# Patient Record
Sex: FEMALE | Race: WHITE | NOT HISPANIC OR LATINO | Employment: FULL TIME | URBAN - METROPOLITAN AREA
[De-identification: names, ages, dates, MRNs, and addresses within clinical notes are randomized per-mention and may not be internally consistent; named-entity substitution may affect disease eponyms.]

---

## 2018-01-17 ENCOUNTER — HOSPITAL ENCOUNTER (EMERGENCY)
Facility: HOSPITAL | Age: 43
Discharge: HOME | End: 2018-01-17
Payer: COMMERCIAL

## 2018-01-17 VITALS
RESPIRATION RATE: 20 BRPM | BODY MASS INDEX: 47.39 KG/M2 | TEMPERATURE: 99 F | WEIGHT: 251 LBS | OXYGEN SATURATION: 98 % | HEART RATE: 90 BPM | HEIGHT: 61 IN

## 2018-01-17 DIAGNOSIS — M25.511 SHOULDER PAIN, RIGHT: ICD-10-CM

## 2018-01-17 PROCEDURE — 99283 EMERGENCY DEPT VISIT LOW MDM: CPT

## 2018-01-17 RX ORDER — CYCLOBENZAPRINE HCL 10 MG
10 TABLET ORAL NIGHTLY PRN
Qty: 10 TABLET | Refills: 0 | Status: SHIPPED | OUTPATIENT
Start: 2018-01-17 | End: 2018-01-27

## 2018-01-17 RX ORDER — DICLOFENAC SODIUM 75 MG/1
75 TABLET, DELAYED RELEASE ORAL 2 TIMES DAILY
Qty: 10 TABLET | Refills: 0 | Status: SHIPPED | OUTPATIENT
Start: 2018-01-17

## 2018-01-17 RX ORDER — DULOXETIN HYDROCHLORIDE 60 MG/1
60 CAPSULE, DELAYED RELEASE ORAL DAILY
COMMUNITY

## 2018-01-18 NOTE — ED PROVIDER NOTES
"  History      Chief Complaint   Patient presents with    Arm Pain     Pt states, "I can't raise my right arm." Reports pain in R shoulder with raising arm. Onset last night. Denies injury or trauma.        Review of patient's allergies indicates:  No Known Allergies     HPI   HPI    2018, 10:14 PM   History obtained from the patient      History of Present Illness: Billie Samuel is a 42 y.o. female patient who presents to the Emergency Department for right shoulder pain x one day.  Denies trauma/injury.  Patient reports pain worse with movement.  No treatments tried.  Denies fever, chest pain, SOB, dizziness, numbness, tingling.       Arrival mode: Personal vehicle      PCP: No primary care provider on file.       Past Medical History:  Past Medical History:   Diagnosis Date    Anxiety     DM (diabetes mellitus), type 2        Past Surgical History:  Past Surgical History:   Procedure Laterality Date     SECTION      TEAR DUCT SURGERY           Family History:  History reviewed. No pertinent family history.    Social History:  Social History     Social History Main Topics    Smoking status: Current Every Day Smoker     Packs/day: 1.00     Types: Cigarettes    Smokeless tobacco: Never Used    Alcohol use No    Drug use: No    Sexual activity: Not on file       ROS   Review of Systems   Constitutional: Negative for chills and fever.   HENT: Negative for congestion and rhinorrhea.    Respiratory: Negative for cough and wheezing.    Gastrointestinal: Negative for diarrhea and vomiting.   Musculoskeletal: Positive for arthralgias and myalgias.       Physical Exam      Initial Vitals [18 1548]   BP Pulse Resp Temp SpO2   -- 90 20 98.6 °F (37 °C) 98 %      MAP       --          Physical Exam  Nursing Notes and Vital Signs Reviewed.  Constitutional: Patient is in no apparent distress. Awake and alert. Well-developed and well-nourished.  Head: Atraumatic. Normocephalic.  Eyes: PERRL. EOM " "intact. Conjunctivae are not pale. No scleral icterus.  ENT: Mucous membranes are moist. Oropharynx is clear and symmetric.    Neck: Supple. Full ROM. No lymphadenopathy.  Cardiovascular: Regular rate. Regular rhythm. No murmurs, rubs, or gallops. Distal pulses are 2+ and symmetric.  Pulmonary/Chest: No respiratory distress. Clear to auscultation bilaterally. No wheezing, rales, or rhonchi.  Abdominal: Soft and non-distended.  There is no tenderness.  No rebound, guarding, or rigidity. Good bowel sounds.  Musculoskeletal: Moves all extremities. No obvious deformities. No edema. No calf tenderness.  RUE:  Pain with extension and flexion of right arm. TTP anterior and posterior shoulder.  Decreased active ROM,  Full passive ROM.  Skin: Warm and dry.  Neurological:  Alert, awake, and appropriate.  Normal speech.  No acute focal neurological deficits are appreciated.  Psychiatric: Normal affect. Good eye contact. Appropriate in content.    ED Course    Procedures  ED Vital Signs:  Vitals:    01/17/18 1548   Pulse: 90   Resp: 20   Temp: 98.6 °F (37 °C)   TempSrc: Oral   SpO2: 98%   Weight: 113.9 kg (251 lb)   Height: 5' 1" (1.549 m)       Abnormal Lab Results:  Labs Reviewed - No data to display         Imaging Results:  Imaging Results          X-Ray Shoulder Complete 2 View Right (Final result)  Result time 01/17/18 16:39:55    Final result by Thong Dos Santos MD (01/17/18 16:39:55)                 Impression:      No acute findings.  Small periarticular calcifications.      Electronically signed by: THONG DOS SANTOS MD  Date:     01/17/18  Time:    16:39              Narrative:    Exam: XR SHOULDER COMPLETE 2 OR MORE VIEWS RIGHT,    Date:  01/17/18 16:31:02    History: Right shoulder joint pain    Comparison:  No prior relevant studies available    Findings: No acute fracture or dislocation.  The a.c. joint is normal.  There are small calcifications adjacent to the humeral head which may represent evidence of " calcific bursitis or tendinitis.                                      The Emergency Provider reviewed the vital signs and test results, which are outlined above.    ED Discussion     5:18 PM: Discussed with pt all pertinent ED information and results. Discussed pt dx and plan of tx. Gave pt all f/u and return to the ED instructions. All questions and concerns were addressed at this time. Pt expresses understanding of information and instructions, and is comfortable with plan to discharge. Pt is stable for discharge.    I discussed with patient and/or family/caretaker that negative X-ray does not rule out occult fracture or other soft tissue injury.  Persistent pain greater than 7-10 days or increased pain requires follow up, specifically with orthopedics.     I discussed with patient and/or family/caretaker that evaluation in the ED does not suggest any emergent or life threatening medical conditions requiring immediate intervention beyond what was provided in the ED, and I believe patient is safe for discharge.  Regardless, an unremarkable evaluation in the ED does not preclude the development or presence of a serious of life threatening condition. As such, patient was instructed to return immediately for any worsening or change in current symptoms.        ED Medication(s):  Medications - No data to display    Discharge Medication List as of 1/17/2018  5:18 PM      START taking these medications    Details   cyclobenzaprine (FLEXERIL) 10 MG tablet Take 1 tablet (10 mg total) by mouth nightly as needed., Starting Wed 1/17/2018, Until Sat 1/27/2018, Print      diclofenac (VOLTAREN) 75 MG EC tablet Take 1 tablet (75 mg total) by mouth 2 (two) times daily., Starting Wed 1/17/2018, Print             Follow-up Information     [facility] In 3 days.    Specialty:  Orthopedics  Contact information:  [address]  [facility] [address] [phone]  Additional information:  [facility] 2nd  floor                   Medical Decision Making                  Clinical Impression       ICD-10-CM ICD-9-CM   1. Shoulder pain, right M25.511 719.41       Disposition:   Disposition: Discharged  Condition: Stable           Alicia Welsh PA-C  01/17/18 2210

## 2021-04-29 ENCOUNTER — PATIENT MESSAGE (OUTPATIENT)
Dept: RESEARCH | Facility: HOSPITAL | Age: 46
End: 2021-04-29

## 2021-06-17 ENCOUNTER — OFFICE VISIT (OUTPATIENT)
Dept: DERMATOLOGY | Facility: CLINIC | Age: 46
End: 2021-06-17
Payer: COMMERCIAL

## 2021-06-17 DIAGNOSIS — L82.1 SEBORRHEIC KERATOSIS: ICD-10-CM

## 2021-06-17 DIAGNOSIS — L30.4 INTERTRIGO: ICD-10-CM

## 2021-06-17 DIAGNOSIS — L73.2 HIDRADENITIS SUPPURATIVA: Primary | ICD-10-CM

## 2021-06-17 DIAGNOSIS — Z51.81 MEDICATION MONITORING ENCOUNTER: ICD-10-CM

## 2021-06-17 PROCEDURE — 99204 OFFICE O/P NEW MOD 45 MIN: CPT | Mod: S$GLB,,, | Performed by: DERMATOLOGY

## 2021-06-17 PROCEDURE — 99999 PR PBB SHADOW E&M-EST. PATIENT-LVL III: ICD-10-PCS | Mod: PBBFAC,,, | Performed by: DERMATOLOGY

## 2021-06-17 PROCEDURE — 99999 PR PBB SHADOW E&M-EST. PATIENT-LVL III: CPT | Mod: PBBFAC,,, | Performed by: DERMATOLOGY

## 2021-06-17 PROCEDURE — 99204 PR OFFICE/OUTPT VISIT, NEW, LEVL IV, 45-59 MIN: ICD-10-PCS | Mod: S$GLB,,, | Performed by: DERMATOLOGY

## 2021-06-17 RX ORDER — GABAPENTIN 300 MG/1
300 CAPSULE ORAL
COMMUNITY
Start: 2021-06-15

## 2021-06-17 RX ORDER — METFORMIN HYDROCHLORIDE 500 MG/1
1500 TABLET ORAL
COMMUNITY
Start: 2018-05-01

## 2021-06-17 RX ORDER — SPIRONOLACTONE 50 MG/1
TABLET, FILM COATED ORAL
Qty: 60 TABLET | Refills: 3 | Status: SHIPPED | OUTPATIENT
Start: 2021-06-17

## 2021-06-17 RX ORDER — LIRAGLUTIDE 6 MG/ML
INJECTION SUBCUTANEOUS
COMMUNITY
Start: 2021-03-23

## 2021-06-17 RX ORDER — LISINOPRIL 10 MG/1
10 TABLET ORAL DAILY
COMMUNITY
Start: 2021-01-29

## 2021-06-17 RX ORDER — BUSPIRONE HYDROCHLORIDE 10 MG/1
20 TABLET ORAL
COMMUNITY
Start: 2021-06-15

## 2021-06-17 RX ORDER — DOXYCYCLINE 100 MG/1
100 CAPSULE ORAL 2 TIMES DAILY
Qty: 28 CAPSULE | Refills: 0 | Status: SHIPPED | OUTPATIENT
Start: 2021-06-17 | End: 2021-07-01

## 2021-06-17 RX ORDER — INSULIN GLARGINE 300 U/ML
10 INJECTION, SOLUTION SUBCUTANEOUS
COMMUNITY
Start: 2021-06-16

## 2021-06-17 RX ORDER — DULOXETIN HYDROCHLORIDE 60 MG/1
60 CAPSULE, DELAYED RELEASE ORAL
COMMUNITY
Start: 2021-06-15

## 2021-06-17 RX ORDER — CLINDAMYCIN PHOSPHATE 11.9 MG/ML
SOLUTION TOPICAL 2 TIMES DAILY
Qty: 60 ML | Refills: 3 | Status: SHIPPED | OUTPATIENT
Start: 2021-06-17

## 2021-06-17 RX ORDER — ATORVASTATIN CALCIUM 80 MG/1
80 TABLET, FILM COATED ORAL DAILY
COMMUNITY
Start: 2021-02-01

## 2021-06-17 RX ORDER — EMPAGLIFLOZIN 25 MG/1
25 TABLET, FILM COATED ORAL DAILY
COMMUNITY
Start: 2021-04-26

## 2021-06-17 RX ORDER — ERGOCALCIFEROL 1.25 MG/1
50000 CAPSULE ORAL
COMMUNITY
Start: 2021-02-01 | End: 2022-02-01

## 2022-02-28 ENCOUNTER — HOSPITAL ENCOUNTER (EMERGENCY)
Facility: HOSPITAL | Age: 47
Discharge: HOME | End: 2022-02-28
Attending: EMERGENCY MEDICINE
Payer: COMMERCIAL

## 2022-02-28 VITALS
HEART RATE: 97 BPM | RESPIRATION RATE: 18 BRPM | WEIGHT: 261.81 LBS | OXYGEN SATURATION: 100 % | HEIGHT: 62 IN | BODY MASS INDEX: 48.18 KG/M2 | TEMPERATURE: 99 F | DIASTOLIC BLOOD PRESSURE: 85 MMHG | SYSTOLIC BLOOD PRESSURE: 181 MMHG

## 2022-02-28 DIAGNOSIS — M54.50 LOW BACK PAIN, UNSPECIFIED BACK PAIN LATERALITY, UNSPECIFIED CHRONICITY, UNSPECIFIED WHETHER SCIATICA PRESENT: Primary | ICD-10-CM

## 2022-02-28 PROCEDURE — 99284 EMERGENCY DEPT VISIT MOD MDM: CPT | Mod: 25,ER

## 2022-02-28 RX ORDER — METHYLPREDNISOLONE 4 MG/1
TABLET ORAL
Qty: 21 EACH | Refills: 0 | Status: SHIPPED | OUTPATIENT
Start: 2022-02-28 | End: 2022-03-21

## 2022-02-28 NOTE — ED PROVIDER NOTES
Encounter Date: 2022       History     Chief Complaint   Patient presents with    Back Pain     With right leg pain; pt states she fell on her buttocks approx 2 weeks ago, was seen and treated at urgent care; did not have xrays; back and leg are not better.     Patient presents to ER for low back pain, onset approximately 2 week ago after experiencing trip and fall.  Patient states she was evaluated at a local urgent care and was discharged with prescription for ibuprofen and muscle relaxant, which initially provided relief but she states the pain has worsened since onset.  Pain is localized to the lumbar region.  Pain radiates down right leg.  She denies numbness, tingling, saddle anesthesia, bladder/bowel dysfunction, fever, dysuria, flank pain.    The history is provided by the patient.     Review of patient's allergies indicates:   Allergen Reactions    Metal staples [surgical stainless steel] Other (See Comments)     Pt states she cannot have metal penetrate body, such as staples, ect. Gets keloids from this.    Icosapent ethyl Rash     Past Medical History:   Diagnosis Date    Anxiety     DM (diabetes mellitus), type 2      Past Surgical History:   Procedure Laterality Date     SECTION      TEAR DUCT SURGERY       No family history on file.  Social History     Tobacco Use    Smoking status: Current Every Day Smoker     Packs/day: 1.00     Types: Cigarettes    Smokeless tobacco: Never Used   Substance Use Topics    Alcohol use: No    Drug use: No     Review of Systems   Constitutional: Negative for chills, fatigue and fever.   HENT: Negative for ear pain, rhinorrhea, sinus pressure, sinus pain and sore throat.    Eyes: Negative for pain.   Respiratory: Negative for cough and shortness of breath.    Cardiovascular: Negative for chest pain and palpitations.   Gastrointestinal: Negative for abdominal pain, nausea and vomiting.   Genitourinary: Negative for difficulty urinating, dysuria,  flank pain and frequency.   Musculoskeletal: Positive for back pain. Negative for myalgias and neck pain.   Skin: Negative for rash.   Neurological: Negative for weakness, numbness and headaches.   All other systems reviewed and are negative.      Physical Exam     Initial Vitals   BP Pulse Resp Temp SpO2   02/28/22 1205 02/28/22 1202 02/28/22 1202 02/28/22 1202 02/28/22 1202   (!) 181/85 97 18 98.9 °F (37.2 °C) 100 %      MAP       --                Physical Exam    Nursing note and vitals reviewed.  Constitutional: She appears well-developed and well-nourished. She is not diaphoretic. No distress.   HENT:   Head: Normocephalic and atraumatic.   Nose: Nose normal.   Eyes: Conjunctivae and EOM are normal. Pupils are equal, round, and reactive to light.   Neck: Neck supple.   Normal range of motion.  Cardiovascular: Normal rate, regular rhythm and intact distal pulses.   Pulmonary/Chest: Breath sounds normal. No respiratory distress.   Abdominal: Abdomen is soft. She exhibits no distension. There is no abdominal tenderness.   No right CVA tenderness.  No left CVA tenderness.   Musculoskeletal:         General: Normal range of motion.      Cervical back: Normal, normal range of motion and neck supple.      Thoracic back: Normal.      Lumbar back: Tenderness present. No swelling, edema or deformity. Normal range of motion.     Neurological: She is alert and oriented to person, place, and time. She has normal strength. No sensory deficit. GCS score is 15. GCS eye subscore is 4. GCS verbal subscore is 5. GCS motor subscore is 6.   Skin: Skin is warm and dry. Capillary refill takes less than 2 seconds.         ED Course   Procedures  Labs Reviewed - No data to display       Imaging Results          CT Lumbar Spine Without Contrast (Final result)  Result time 02/28/22 12:56:37    Final result by Antelmo Olivares MD (02/28/22 12:56:37)                 Impression:      No acute fracture or traumatic malalignment.  Disc  bulge at L4-5 possibly contributes to mild-moderate spinal canal stenosis.  Consider further evaluation as warranted.      Electronically signed by: Antelmo Olivares  Date:    02/28/2022  Time:    12:56             Narrative:    EXAMINATION:  CT LUMBAR SPINE WITHOUT CONTRAST    CLINICAL HISTORY:  fall, low back pain;    TECHNIQUE:  Low-dose axial, sagittal and coronal reformations are obtained through the lumbar spine.  Contrast was not administered.    COMPARISON:  None.    FINDINGS:  The lumbar vertebral bodies demonstrate adequate alignment.The vertebral body heights are well-maintained.No fractures are identified.  No destructive osseous lesion.  Intervertebral disc spaces are satisfactorily maintained.  Diffuse posterior disc bulge at L4-5 possibly contributing to mild-moderate spinal canal stenosis.  No significant neural foraminal narrowing.    The remaining visualized paravertebral structures demonstrate no pathology.                                 Medications - No data to display              ED Course as of 03/01/22 1132   Mon Feb 28, 2022   1320 Discussed patient's case and lumbar CT results with Dr. Smith, on-call Neurosurgery, who recommends following up with Neurosurgery outpatient tomorrow, scheduling outpatient MRI, and discharge patient home with Rx for steroid pack.  Discuss results and these recommendations with patient and she verbalized understanding with no further questions concerns.  Patient states comfortable discharge home. [BS]      ED Course User Index  [BS] Sea Zuniga, TONG          I discussed with patient that evaluation in the ED does not suggest any emergent or life threatening medical conditions requiring immediate intervention beyond what was provided in the ED, and I believe patient is safe for discharge. Regardless, an unremarkable evaluation in the ED does not preclude the development or presence of a serious of life threatening condition. As such, patient was instructed to  return immediately for any worsening or change in current symptoms.      Clinical Impression:   Final diagnoses:  [M54.50] Low back pain, unspecified back pain laterality, unspecified chronicity, unspecified whether sciatica present (Primary)          ED Disposition Condition    Discharge Stable        ED Prescriptions     Medication Sig Dispense Start Date End Date Auth. Provider    methylPREDNISolone (MEDROL DOSEPACK) 4 mg tablet use as directed 21 each 2/28/2022 3/21/2022 Sea Zuniga NP        Follow-up Information     Follow up With Specialties Details Why Contact Info    [facility] Neurosurgery Schedule an appointment as soon as possible for a visit in 1 day  [address]  [facility] [address] [phone]    [facility] - Emergency Dept Emergency Medicine  As needed, If symptoms worsen [address] [address] [address] [phone]           Sea Zuniga NP  03/01/22 8691

## 2022-11-29 DIAGNOSIS — M25.561 RIGHT KNEE PAIN, UNSPECIFIED CHRONICITY: Primary | ICD-10-CM
